# Patient Record
Sex: FEMALE | Race: WHITE | NOT HISPANIC OR LATINO | Employment: FULL TIME | URBAN - METROPOLITAN AREA
[De-identification: names, ages, dates, MRNs, and addresses within clinical notes are randomized per-mention and may not be internally consistent; named-entity substitution may affect disease eponyms.]

---

## 2021-05-18 ENCOUNTER — TELEPHONE (OUTPATIENT)
Dept: OBGYN CLINIC | Facility: CLINIC | Age: 51
End: 2021-05-18

## 2021-05-18 NOTE — TELEPHONE ENCOUNTER
No form scanned in to ecw    Mirena IUD Removal / Brent Soho IUD Insert -- per rep Yolie at [place], patient is covered 100%, no copay, no deductible  Reference #[id number]

## 2021-05-18 NOTE — TELEPHONE ENCOUNTER
Patient called in asking if the IUD benefits was verified yet, stating she signed the form before April 1st  I don't see anything in the old system nor new  Patient has a Mirena now and wants it to be removed and get a liletta inserted

## 2021-07-16 RX ORDER — CLOBETASOL PROPIONATE 0.5 MG/G
OINTMENT TOPICAL 2 TIMES DAILY
COMMUNITY
End: 2022-07-11

## 2021-07-16 RX ORDER — MULTIVIT-MIN/IRON/FOLIC ACID/K 18-600-40
1 CAPSULE ORAL DAILY
COMMUNITY

## 2021-07-16 RX ORDER — MAGNESIUM OXIDE/MAG AA CHELATE 300 MG
1 CAPSULE ORAL DAILY
COMMUNITY

## 2021-07-16 RX ORDER — BUPRENORPHINE 2 MG/1
2 TABLET SUBLINGUAL DAILY
COMMUNITY
End: 2022-07-10

## 2021-07-16 RX ORDER — B-COMPLEX WITH VITAMIN C
1 TABLET ORAL DAILY
COMMUNITY

## 2021-07-19 ENCOUNTER — PROCEDURE VISIT (OUTPATIENT)
Dept: OBGYN CLINIC | Facility: CLINIC | Age: 51
End: 2021-07-19
Payer: COMMERCIAL

## 2021-07-19 VITALS
HEIGHT: 66 IN | SYSTOLIC BLOOD PRESSURE: 120 MMHG | BODY MASS INDEX: 31.4 KG/M2 | WEIGHT: 195.4 LBS | DIASTOLIC BLOOD PRESSURE: 60 MMHG

## 2021-07-19 DIAGNOSIS — Z97.5 CONTRACEPTION, DEVICE INTRAUTERINE: ICD-10-CM

## 2021-07-19 DIAGNOSIS — Z30.433 ENCOUNTER FOR REMOVAL AND REINSERTION OF INTRAUTERINE CONTRACEPTIVE DEVICE (IUD): Primary | ICD-10-CM

## 2021-07-19 PROCEDURE — 58301 REMOVE INTRAUTERINE DEVICE: CPT | Performed by: OBSTETRICS & GYNECOLOGY

## 2021-07-19 PROCEDURE — 58300 INSERT INTRAUTERINE DEVICE: CPT | Performed by: OBSTETRICS & GYNECOLOGY

## 2021-07-19 RX ORDER — HYDROCHLOROTHIAZIDE 25 MG/1
1 TABLET ORAL EVERY 24 HOURS
COMMUNITY

## 2021-07-19 RX ORDER — BUPROPION HYDROCHLORIDE 100 MG/1
1 TABLET ORAL 2 TIMES DAILY
COMMUNITY

## 2021-07-19 NOTE — PROGRESS NOTES
[address], [address], [facility], [address] [facility]    Assessment/Plan:  Henrique Spears is a 46y o  year old  who presents for removal of current IUD and reinsertion of new IUD  1  Encounter for removal and reinsertion of intrauterine contraceptive device (IUD)  -     Iud removal    2  Contraception, device intrauterine  -     levonorgestrel (LILETTA) intrauterine device 19 5 mcg/day        Next Exam: 4 weeks string check    Subjective:     CC: IUD    HPI: Jeanne Lebron is a 46 y o  who presents IUD removal and replacement  Pt had Mirena IUD placed in  per prior [facility] records  She reports has not had another placed since done last in our office, although she cannot remember the year  She reports regular light menses  The following portions of the patient's history were reviewed and updated as appropriate: allergies, current medications, past family history, past medical history, obstetric history, gynecologic history, past social history, past surgical history and problem list     ROS: Review of Systems   Constitutional: Negative  Gastrointestinal: Negative  Genitourinary: Negative  Psychiatric/Behavioral: Negative            Current Outpatient Medications:     Ascorbic Acid (Vitamin C) 500 MG CAPS, Take 1 capsule by mouth daily, Disp: , Rfl:     buprenorphine (SUBUTEX) 2 mg, Place 2 mg under the tongue daily, Disp: , Rfl:     buPROPion (WELLBUTRIN) 100 mg tablet, Take 1 tablet by mouth 2 (two) times a day, Disp: , Rfl:     clobetasol (TEMOVATE) 0 05 % ointment, Apply topically 2 (two) times a day, Disp: , Rfl:     hydrochlorothiazide (HYDRODIURIL) 25 mg tablet, Take 1 tablet by mouth every 24 hours, Disp: , Rfl:     Magnesium 300 MG CAPS, Take 1 capsule by mouth daily, Disp: , Rfl:     Multiple Vitamins-Minerals (Multivitamin Women 50+) TABS, Take 1 tablet by mouth daily, Disp: , Rfl:     Zinc 100 MG TABS, Take 1 tablet by mouth daily, Disp: , Rfl: No current facility-administered medications for this visit  Objective:  /60 (BP Location: Left arm, Patient Position: Sitting, Cuff Size: Large)   Ht 5' 6 25" (1 683 m)   Wt 88 6 kg (195 lb 6 4 oz)   LMP 2021 (Approximate)   Breastfeeding No   BMI 31 30 kg/m²      Physical Exam  Constitutional:       Appearance: Normal appearance  Genitourinary:     General: Normal vulva  Vagina: Normal       Cervix: Normal       Uterus: Normal  Not enlarged and not tender  Adnexa:         Right: No mass or tenderness  Left: No mass or tenderness  Rectum: No external hemorrhoid  Comments: IUD string palpated but not seen at os  Neurological:      Mental Status: She is alert  Psychiatric:         Mood and Affect: Mood normal          Behavior: Behavior normal          Iud removal    Date/Time: 2021 5:05 PM  Performed by: Verenice Rubio MD  Authorized by: Verenice Rubio MD   Universal Protocol:  Consent: Verbal consent obtained  Written consent not obtained  Risks and benefits: risks, benefits and alternatives were discussed  Consent given by: patient  Patient understanding: patient states understanding of the procedure being performed  Patient identity confirmed: verbally with patient      Procedure:     Removed with no complications: yes      Other reason for removal:  IUD   Comments:      IUD string grasped with ring forceps and removed intact  Iud insertions    Date/Time: 2021 5:10 PM  Performed by: Verenice Rubio MD  Authorized by: Verenice Rubio MD   Universal Protocol:  Consent: Verbal consent obtained  Written consent obtained  Risks and benefits: risks, benefits and alternatives were discussed  Consent given by: patient  Patient understanding: patient states understanding of the procedure being performed        Procedure:     Pelvic exam performed: yes      Negative GC/chlamydia test: not done - pt declined        Negative urine pregnancy test: yes      Cervix cleaned and prepped: yes      Speculum placed in vagina: yes      Tenaculum applied to cervix: yes      Uterus sounded: yes      Uterus sound depth (cm):  9    IUD inserted with no complications: yes      IUD type: Marrianne Blank  Strings trimmed: yes    Post-procedure:     Patient tolerated procedure well: yes    Comments: Follow up for string check in 4 weeks

## 2021-07-19 NOTE — PATIENT INSTRUCTIONS
- Discussed to expect irregular bleeding/spotting for 3 months  Reviewed does not protect against sexually transmitted infections  Reviewed pt to call if severe pain, fever, bleeding heavier than a period  Return in 4 wks for string check  Reviewed how to check for string at home  -  If received a progesterone IUD and it is placed within first 7 days of period, it is effective as contraception immediately, if more than 7 days since start of period - wait 7 days for it to be effective contraception  If received a Paraguard IUD, it is effective contraception immediately

## 2021-09-20 ENCOUNTER — VBI (OUTPATIENT)
Dept: ADMINISTRATIVE | Facility: OTHER | Age: 51
End: 2021-09-20

## 2021-09-20 NOTE — TELEPHONE ENCOUNTER
Upon review of the In Basket request we were able to locate, review, and update the patient chart as requested for Pap Smear (HPV) aka Cervical Cancer Screening  Any additional questions or concerns should be emailed to the Practice Liaisons via [email or web address] email, please do not reply via In Basket      Thank you  Maryam Farias

## 2022-07-10 NOTE — PROGRESS NOTES
[address] [facility], [address], [address] [place] [address]    ASSESSMENT/PLAN: Maykel Cherry is a 46 y o   who presents for annual gynecologic exam     Encounter for routine gynecologic examination  - Routine well woman exam completed today  - Cervical Cancer Screening: Current ASCCP Guidelines reviewed  Last Pap: 2021   Next Pap Due:   - COVID vaccine  JJ  X  1  No booster    - Contraceptive counseling discussed  Current contraception   Liletta iud  - Breast Cancer Screening: Last Mammogram Not on file,     Additional problems addressed during this visit:  1  Encounter for screening mammogram for malignant neoplasm of breast    2  Encounter for gynecological examination without abnormal finding        CC: Annual Gynecologic Examination    HPI: Maykel Cherry is a 46 y o   who presents for annual gynecologic examination  47 yo here for wellness exam   Past  med hx  htn   , family hx of breast cancer    Maternal aunt,   Colon cancer ,  Cousins with   Breast, colon and ovarian cancer   + hx of  Vulvitis in     Iud   Placed    + Pending  Genetics for  Breast, ovarian and colon cancer  Dr Mariia Hathaway  + bx   Breast calcifications  Scheduled  For removal   No bleeding,  Bloating or satiety  Some night sweats       The following portions of the patient's history were reviewed and updated as appropriate: She  has a past medical history of Depression and High blood pressure  She  has no past surgical history on file  Her family history includes Breast cancer in her maternal aunt; Colon cancer in her brother and maternal aunt; Diabetes in her father; Hypertension in her mother; Ovarian cancer in her cousin; Stroke in her maternal grandmother  She  reports that she has never smoked  She has never used smokeless tobacco  She reports that she does not drink alcohol and does not use drugs    Current Outpatient Medications   Medication Sig Dispense Refill    Ascorbic Acid (Vitamin C) 500 MG CAPS Take 1 capsule by mouth daily      buPROPion (WELLBUTRIN) 100 mg tablet Take 1 tablet by mouth 2 (two) times a day      clobetasol (TEMOVATE) 0 05 % ointment Apply topically 2 (two) times a day      hydrochlorothiazide (HYDRODIURIL) 25 mg tablet Take 1 tablet by mouth every 24 hours      Magnesium 300 MG CAPS Take 1 capsule by mouth daily      Multiple Vitamins-Minerals (Multivitamin Women 50+) TABS Take 1 tablet by mouth daily      Zinc 100 MG TABS Take 1 tablet by mouth daily       No current facility-administered medications for this visit  She has No Known Allergies       Review of Systems   Constitutional: Negative for chills and fever  HENT: Negative for ear pain and sore throat  Eyes: Negative for pain and visual disturbance  Respiratory: Negative for cough and shortness of breath  Cardiovascular: Negative for chest pain and palpitations  Gastrointestinal: Negative for abdominal pain and vomiting  Genitourinary: Negative for dysuria and hematuria  Musculoskeletal: Negative for arthralgias and back pain  Skin: Negative for color change and rash  Neurological: Negative for seizures and syncope  Psychiatric/Behavioral: Negative  All other systems reviewed and are negative  Objective: There were no vitals taken for this visit  Physical Exam  Vitals and nursing note reviewed  Constitutional:       Appearance: Normal appearance  HENT:      Head: Normocephalic  Cardiovascular:      Rate and Rhythm: Normal rate and regular rhythm  Pulses: Normal pulses  Heart sounds: Normal heart sounds  Pulmonary:      Effort: Pulmonary effort is normal       Breath sounds: Normal breath sounds  Chest:      Chest wall: No mass, lacerations, swelling, tenderness or edema  Breasts: Marc Score is 4   Breasts are symmetrical       Right: Normal  No swelling, bleeding, inverted nipple, mass, nipple discharge, skin change, tenderness, axillary adenopathy or supraclavicular adenopathy  Left: No swelling, bleeding, inverted nipple, mass, nipple discharge, skin change, tenderness, axillary adenopathy or supraclavicular adenopathy  Abdominal:      General: Abdomen is flat  Bowel sounds are normal       Palpations: Abdomen is soft  Genitourinary:     General: Normal vulva  Exam position: Lithotomy position  Pubic Area: No rash  Marc stage (genital): 4       Labia:         Right: No rash, tenderness or lesion  Left: No rash, tenderness or lesion  Urethra: No urethral pain, urethral swelling or urethral lesion  Vagina: Normal       Cervix: Normal       Uterus: Normal        Adnexa: Right adnexa normal and left adnexa normal       Rectum: Normal          Musculoskeletal:         General: Normal range of motion  Cervical back: Neck supple  Lymphadenopathy:      Upper Body:      Right upper body: No supraclavicular, axillary or pectoral adenopathy  Left upper body: No supraclavicular, axillary or pectoral adenopathy  Lower Body: No right inguinal adenopathy  No left inguinal adenopathy  Skin:     General: Skin is warm and dry  Neurological:      General: No focal deficit present  Mental Status: She is alert and oriented to person, place, and time  Psychiatric:         Mood and Affect: Mood normal          Behavior: Behavior normal          Thought Content:  Thought content normal          Judgment: Judgment normal

## 2022-07-11 ENCOUNTER — ANNUAL EXAM (OUTPATIENT)
Dept: OBGYN CLINIC | Facility: CLINIC | Age: 52
End: 2022-07-11
Payer: COMMERCIAL

## 2022-07-11 VITALS
HEIGHT: 67 IN | SYSTOLIC BLOOD PRESSURE: 132 MMHG | BODY MASS INDEX: 31.3 KG/M2 | DIASTOLIC BLOOD PRESSURE: 90 MMHG | WEIGHT: 199.4 LBS

## 2022-07-11 DIAGNOSIS — Z80.3 FAMILY HISTORY OF BREAST CANCER: ICD-10-CM

## 2022-07-11 DIAGNOSIS — R92.1 BREAST CALCIFICATIONS: ICD-10-CM

## 2022-07-11 DIAGNOSIS — R03.0 BLOOD PRESSURE ELEVATED WITHOUT HISTORY OF HTN: ICD-10-CM

## 2022-07-11 DIAGNOSIS — Z01.419 ENCOUNTER FOR GYNECOLOGICAL EXAMINATION WITHOUT ABNORMAL FINDING: Primary | ICD-10-CM

## 2022-07-11 DIAGNOSIS — N76.3 SUBACUTE VULVITIS: ICD-10-CM

## 2022-07-11 DIAGNOSIS — Z12.31 ENCOUNTER FOR SCREENING MAMMOGRAM FOR MALIGNANT NEOPLASM OF BREAST: ICD-10-CM

## 2022-07-11 DIAGNOSIS — Z98.890 PERSONAL HISTORY OF BENIGN BREAST BIOPSY: ICD-10-CM

## 2022-07-11 DIAGNOSIS — Z30.431 IUD CHECK UP: ICD-10-CM

## 2022-07-11 PROCEDURE — S0612 ANNUAL GYNECOLOGICAL EXAMINA: HCPCS | Performed by: OBSTETRICS & GYNECOLOGY

## 2023-03-16 ENCOUNTER — PREP FOR PROCEDURE (OUTPATIENT)
Dept: GASTROENTEROLOGY | Facility: CLINIC | Age: 53
End: 2023-03-16

## 2023-03-16 ENCOUNTER — TELEPHONE (OUTPATIENT)
Dept: GASTROENTEROLOGY | Facility: CLINIC | Age: 53
End: 2023-03-16

## 2023-03-16 DIAGNOSIS — Z12.11 SCREENING FOR COLON CANCER: Primary | ICD-10-CM

## 2023-03-16 NOTE — TELEPHONE ENCOUNTER
Scheduled date of colonoscopy (as of today): 6/30/2023  Physician performing colonoscopy: Dr Kobe Jhavrei  Location of colonoscopy: [facility]  Clearances: N/A

## 2023-03-16 NOTE — TELEPHONE ENCOUNTER
03/16/23  Screened by: Mily Wang    Referring Provider N/A    Pre- Screening: There is no height or weight on file to calculate BMI  Has patient been referred for a routine screening Colonoscopy? yes  Is the patient between 39-70 years old? yes      Previous Colonoscopy no   If yes:    Date:     Facility:     Reason:       SCHEDULING STAFF: If the patient is between 45yrs-49yrs, please advise patient to confirm benefits/coverage with their insurance company for a routine screening colonoscopy, some insurance carriers will only cover at [address] or older  If the patient is over 66years old, please schedule an office visit  Does the patient want to see a Gastroenterologist prior to their procedure OR are they having any GI symptoms? no    Has the patient been hospitalized or had abdominal surgery in the past 6 months? no    Does the patient use supplemental oxygen? no    Does the patient take Coumadin, Lovenox, Plavix, Elliquis, Xarelto, or other blood thinning medication? no    Has the patient had a stroke, cardiac event, or stent placed in the past year? no    SCHEDULING STAFF: If patient answers NO to above questions, then schedule procedure  If patient answers YES to above questions, then schedule office appointment  Pt Passed OA    If patient is between 45yrs - 49yrs, please advise patient that we will have to confirm benefits & coverage with their insurance company for a routine screening colonoscopy

## 2023-03-16 NOTE — TELEPHONE ENCOUNTER
[facility] Assessment    Name: Mai Sparrow  YOB: 1970  Last Height: 5' 6 5" (1 689 m)  Last weight: 90 4 kg (199 lb 6 4 oz)  BMI: 31 70 kg/m²  Procedure: Colon  Diagnosis: Screening for colon cancer  Date of procedure: 6/30/2023  Prep:   Responsible : Ritika  Phone#: [phone]  Name completing form: Kristina Duenas  Date form completed: 03/16/23      If the patient answers yes to any of these questions, schedule in a hospital  Are you pregnant: No  Do you rely on a wheelchair for mobility: No  Have you been diagnosed with End Stage Renal Disease (ESRD): No  Do you need oxygen during the day: No  Have you had a heart attack or stroke within the past three months: No  Have you had a seizure within the past three months: No  Have you ever been informed by anesthesia that you have a difficult airway: No  Additional Questions  Have you had any cardiac testing or are under the care of a Cardiologist (see cardiac list): No  Cardiac list:   Do you have an implanted cardiac defibrillator: No (Comment:  This patient should be scheduled in the hospital)    Have any bleeding problems, such as anemia or hemophilia (If patient has H&H result below 8, schedule in hospital   H&H must be within 30 days of procedure): No    Had an organ transplant within the past 3 months: No    Do you have any present infections: No  Do you get short of breath when walking a few blocks: No  Have you been diagnosed with diabetes: No  Comments (provide cardiac provider information if applicable):

## 2023-06-16 DIAGNOSIS — Z12.11 SCREENING FOR COLON CANCER: Primary | ICD-10-CM

## 2023-06-29 ENCOUNTER — ANESTHESIA (OUTPATIENT)
Dept: ANESTHESIOLOGY | Facility: AMBULATORY SURGERY CENTER | Age: 53
End: 2023-06-29

## 2023-06-29 ENCOUNTER — ANESTHESIA EVENT (OUTPATIENT)
Dept: ANESTHESIOLOGY | Facility: AMBULATORY SURGERY CENTER | Age: 53
End: 2023-06-29

## 2023-06-30 ENCOUNTER — ANESTHESIA (OUTPATIENT)
Dept: GASTROENTEROLOGY | Facility: AMBULATORY SURGERY CENTER | Age: 53
End: 2023-06-30

## 2023-06-30 ENCOUNTER — HOSPITAL ENCOUNTER (OUTPATIENT)
Dept: GASTROENTEROLOGY | Facility: AMBULATORY SURGERY CENTER | Age: 53
Discharge: HOME | End: 2023-06-30

## 2023-06-30 ENCOUNTER — ANESTHESIA EVENT (OUTPATIENT)
Dept: GASTROENTEROLOGY | Facility: AMBULATORY SURGERY CENTER | Age: 53
End: 2023-06-30

## 2023-06-30 VITALS
BODY MASS INDEX: 31.98 KG/M2 | OXYGEN SATURATION: 98 % | RESPIRATION RATE: 17 BRPM | WEIGHT: 199 LBS | HEART RATE: 69 BPM | DIASTOLIC BLOOD PRESSURE: 69 MMHG | TEMPERATURE: 98 F | SYSTOLIC BLOOD PRESSURE: 125 MMHG | HEIGHT: 66 IN

## 2023-06-30 DIAGNOSIS — Z12.11 SCREENING FOR COLON CANCER: ICD-10-CM

## 2023-06-30 LAB
EXT PREGNANCY TEST URINE: NEGATIVE
EXT. CONTROL: NORMAL

## 2023-06-30 PROCEDURE — 88305 TISSUE EXAM BY PATHOLOGIST: CPT | Performed by: STUDENT IN AN ORGANIZED HEALTH CARE EDUCATION/TRAINING PROGRAM

## 2023-06-30 RX ORDER — PROPOFOL 10 MG/ML
INJECTION, EMULSION INTRAVENOUS CONTINUOUS PRN
Status: DISCONTINUED | OUTPATIENT
Start: 2023-06-30 | End: 2023-06-30

## 2023-06-30 RX ORDER — LIDOCAINE HYDROCHLORIDE 10 MG/ML
INJECTION, SOLUTION EPIDURAL; INFILTRATION; INTRACAUDAL; PERINEURAL AS NEEDED
Status: DISCONTINUED | OUTPATIENT
Start: 2023-06-30 | End: 2023-06-30

## 2023-06-30 RX ORDER — SODIUM CHLORIDE, SODIUM LACTATE, POTASSIUM CHLORIDE, CALCIUM CHLORIDE 600; 310; 30; 20 MG/100ML; MG/100ML; MG/100ML; MG/100ML
50 INJECTION, SOLUTION INTRAVENOUS CONTINUOUS
Status: DISCONTINUED | OUTPATIENT
Start: 2023-06-30 | End: 2023-07-04 | Stop reason: HOSPADM

## 2023-06-30 RX ORDER — PROPOFOL 10 MG/ML
INJECTION, EMULSION INTRAVENOUS AS NEEDED
Status: DISCONTINUED | OUTPATIENT
Start: 2023-06-30 | End: 2023-06-30

## 2023-06-30 RX ADMIN — PROPOFOL 100 MG: 10 INJECTION, EMULSION INTRAVENOUS at 08:11

## 2023-06-30 RX ADMIN — LIDOCAINE HYDROCHLORIDE 50 MG: 10 INJECTION, SOLUTION EPIDURAL; INFILTRATION; INTRACAUDAL; PERINEURAL at 08:11

## 2023-06-30 RX ADMIN — PROPOFOL 130 MCG/KG/MIN: 10 INJECTION, EMULSION INTRAVENOUS at 08:11

## 2023-06-30 RX ADMIN — SODIUM CHLORIDE, SODIUM LACTATE, POTASSIUM CHLORIDE, CALCIUM CHLORIDE 50 ML/HR: 600; 310; 30; 20 INJECTION, SOLUTION INTRAVENOUS at 07:54

## 2023-06-30 NOTE — ANESTHESIA POSTPROCEDURE EVALUATION
Post-Op Assessment Note    CV Status:  Stable  Pain Score: 0    Pain management: adequate     Mental Status:  Alert and awake   Hydration Status:  Euvolemic   PONV Controlled:  Controlled   Airway Patency:  Patent      Post Op Vitals Reviewed: Yes      Staff: CRNA         No notable events documented      BP   132/72   Temp      Pulse   86   Resp   15   SpO2   97%

## 2023-06-30 NOTE — ANESTHESIA PREPROCEDURE EVALUATION
Procedure:  COLONOSCOPY    Relevant Problems   ANESTHESIA (within normal limits)      CARDIO   (+) Hypertension      NEURO/PSYCH   (+) Depression      PULMONARY (within normal limits)   (-) Sleep apnea   (-) Smoking (former)   (-) URI (upper respiratory infection)    BMI 31    Physical Exam    Airway    Mallampati score: II  TM Distance: >3 FB  Neck ROM: full     Dental       Cardiovascular      Pulmonary      Other Findings         Anesthesia Plan  ASA Score- 2     Anesthesia Type- IV sedation with anesthesia with ASA Monitors  Additional Monitors:   Airway Plan:           Plan Factors-Exercise tolerance (METS): >4 METS  Chart reviewed  Existing labs reviewed  Patient summary reviewed  Patient is not a current smoker  Induction- intravenous  Postoperative Plan-     Informed Consent- Anesthetic plan and risks discussed with patient  I personally reviewed this patient with the CRNA  Discussed and agreed on the Anesthesia Plan with the CRNA  Jered Ocampo

## 2023-06-30 NOTE — H&P
History and Physical - [facility]  Jessica House 48 y.o. female MRN: [id number]    HPI: Jessica House is a 48y.o. year old female who presents for colon cancer screening    REVIEW OF SYSTEMS: Per the HPI, and otherwise unremarkable.     Historical Information   Past Medical History:   Diagnosis Date   • Depression    • High blood pressure    • Hypertension 2005     Past Surgical History:   Procedure Laterality Date   • BREAST BIOPSY Right      Social History   Social History     Substance and Sexual Activity   Alcohol Use Not Currently    Comment: No alcohol use      Social History     Substance and Sexual Activity   Drug Use Never    Comment: No      Social History     Tobacco Use   Smoking Status Former   • Years: 0.00   • Types: Cigarettes   Smokeless Tobacco Never     Family History   Problem Relation Age of Onset   • Hypertension Mother    • Diabetes Father    • Colon cancer Brother    • Stroke Maternal Grandmother    • Breast cancer Maternal Aunt    • Colon cancer Maternal Aunt    • Ovarian cancer Cousin         cousins       Meds/Allergies       Current Outpatient Medications:   •  Ascorbic Acid (Vitamin C) 500 MG CAPS  •  buPROPion (WELLBUTRIN) 100 mg tablet  •  hydrochlorothiazide (HYDRODIURIL) 25 mg tablet  •  Multiple Vitamins-Minerals (Multivitamin Women 50+) TABS  •  polyethylene glycol (GOLYTELY) 4000 mL solution  •  Zinc 100 MG TABS  •  Magnesium 300 MG CAPS    Current Facility-Administered Medications:   •  lactated ringers infusion, 50 mL/hr, Intravenous, Continuous, 50 mL/hr at 06/30/23 0754    No Known Allergies    Objective     /86   Pulse 89   Temp 98 °F (36.7 °C) (Temporal)   Resp 16   Ht 5' 6" (1.676 m)   Wt 90.3 kg (199 lb)   SpO2 98%   BMI 32.12 kg/m²     PHYSICAL EXAM    Gen: NAD AAOx3  Head: Normocephalic, Atraumatic  CV: S1S2 RRR no m/r/g  CHEST: Clear b/l no c/r/w  ABD: soft, +BS NT/ND  EXT: no edema    ASSESSMENT/PLAN:  This is a 48 y.o. year old female here for colonoscopy, and she is stable and optimized for her procedure.

## 2023-07-17 ENCOUNTER — OFFICE VISIT (OUTPATIENT)
Dept: OBGYN CLINIC | Facility: CLINIC | Age: 53
End: 2023-07-17
Payer: COMMERCIAL

## 2023-07-17 VITALS — SYSTOLIC BLOOD PRESSURE: 134 MMHG | WEIGHT: 207 LBS | BODY MASS INDEX: 33.41 KG/M2 | DIASTOLIC BLOOD PRESSURE: 90 MMHG

## 2023-07-17 DIAGNOSIS — Z80.3 FAMILY HISTORY OF BREAST CANCER: ICD-10-CM

## 2023-07-17 DIAGNOSIS — Z12.31 ENCOUNTER FOR SCREENING MAMMOGRAM FOR MALIGNANT NEOPLASM OF BREAST: ICD-10-CM

## 2023-07-17 DIAGNOSIS — Z30.431 IUD CHECK UP: ICD-10-CM

## 2023-07-17 DIAGNOSIS — Z01.419 ENCOUNTER FOR GYNECOLOGICAL EXAMINATION WITHOUT ABNORMAL FINDING: Primary | ICD-10-CM

## 2023-07-17 PROCEDURE — 99396 PREV VISIT EST AGE 40-64: CPT | Performed by: OBSTETRICS & GYNECOLOGY

## 2023-07-17 NOTE — PROGRESS NOTES
[address] [place] [address], [address], [facility], [address] [facility]    ASSESSMENT/PLAN: Gema Portillo is a 48 y.o.  who presents for annual gynecologic exam.    Encounter for routine gynecologic examination  - Routine well woman exam completed today. - Cervical Cancer Screening: Current ASCCP Guidelines reviewed. Last Pap: 2021 . Next Pap Due:   - Contraceptive counseling discussed. Current contraception is  liletta iud    - Breast Cancer Screening: Last Mammogram followed  By Dr Matias Orozco   Pt  Al t  mamogram and   MRI   - Colorectal cancer screening was not ordered. Done 2023   The following were reviewed in today's visit: breast self exam, mammography screening ordered, menopause, osteoporosis, adequate intake of calcium and vitamin D, exercise, healthy diet and colonoscopy discussed and ordered    Additional problems addressed during this visit:  1. Encounter for gynecological examination without abnormal finding    2. Family history of breast cancer  Comments:   Pt states neg  genetics  . followed  by Dr Emre Morales and MRI    3. Encounter for screening mammogram for malignant neoplasm of breast  -     Mammo screening bilateral w 3d & cad; Future    4. IUD check up        CC: Annual Gynecologic Examination    HPI: Gema Portillo is a 48 y.o.  who presents for annual gynecologic examination. 47 yo  here for wellness exam.  Last pap   Neg  Neg  Due in  . .    . Past  med hx  htn   , family hx of breast cancer    Maternal aunt,   Colon cancer ,  Cousins with   Breast, colon and ovarian cancer.  + hx of  Vulvitis in   . Iud   Placed  .   +   Genetics for  Breast, ovarian and colon cancer neg .  r Dr Matias Orozoc. + bx   Breast calcifications    No bleeding,  Bloating or satiety.    Some night sweats       The following portions of the patient's history were reviewed and updated as appropriate: She  has a past medical history of Breast calcifications, Depression, H/O colonoscopy (06/30/2023), High blood pressure, and Hypertension (2005). She  has a past surgical history that includes Breast biopsy (Right). Her family history includes Breast cancer in her maternal aunt; Cancer in her paternal grandmother; Colon cancer in her brother and maternal aunt; Diabetes in her father; Hypertension in her mother; Lung disease in her father; Ovarian cancer in her cousin; Stroke in her maternal grandmother. She  reports that she has quit smoking. Her smoking use included cigarettes. She has never used smokeless tobacco. She reports that she does not currently use alcohol. She reports that she does not use drugs. Current Outpatient Medications   Medication Sig Dispense Refill   • Ascorbic Acid (Vitamin C) 500 MG CAPS Take 1 capsule by mouth daily     • buPROPion (WELLBUTRIN) 100 mg tablet Take 1 tablet by mouth 2 (two) times a day     • hydrochlorothiazide (HYDRODIURIL) 25 mg tablet Take 1 tablet by mouth every 24 hours     • Multiple Vitamins-Minerals (Multivitamin Women 50+) TABS Take 1 tablet by mouth daily     • Zinc 100 MG TABS Take 1 tablet by mouth daily       No current facility-administered medications for this visit. She has No Known Allergies. .    Review of Systems:  All systems normal except as noted in HPI          Objective:  /90 (BP Location: Left arm, Patient Position: Sitting, Cuff Size: Standard)   Wt 93.9 kg (207 lb)   BMI 33.41 kg/m²    Physical Exam  Vitals and nursing note reviewed. Constitutional:       Appearance: Normal appearance. HENT:      Head: Normocephalic. Cardiovascular:      Rate and Rhythm: Normal rate and regular rhythm. Pulses: Normal pulses. Heart sounds: Normal heart sounds. Pulmonary:      Effort: Pulmonary effort is normal.      Breath sounds: Normal breath sounds. Chest:      Chest wall: No mass, lacerations, swelling, tenderness or edema. Breasts: Marc Score is 4.       Breasts are symmetrical.      Right: Normal. No swelling, bleeding, inverted nipple, mass, nipple discharge, skin change or tenderness. Left: No swelling, bleeding, inverted nipple, mass, nipple discharge, skin change or tenderness. Abdominal:      General: Abdomen is flat. Bowel sounds are normal.      Palpations: Abdomen is soft. Genitourinary:     General: Normal vulva. Exam position: Lithotomy position. Pubic Area: No rash. Marc stage (genital): 4.      Labia:         Right: No rash, tenderness or lesion. Left: No rash, tenderness or lesion. Urethra: No urethral pain, urethral swelling or urethral lesion. Vagina: Normal.      Cervix: No cervical motion tenderness or discharge. Uterus: Normal.       Adnexa: Right adnexa normal and left adnexa normal.      Rectum: Normal.            Comments: iud string in  Os   Musculoskeletal:         General: Normal range of motion. Cervical back: Neck supple. Lymphadenopathy:      Upper Body:      Right upper body: No supraclavicular, axillary or pectoral adenopathy. Left upper body: No supraclavicular, axillary or pectoral adenopathy. Lower Body: No right inguinal adenopathy. No left inguinal adenopathy. Skin:     General: Skin is warm and dry. Neurological:      General: No focal deficit present. Mental Status: She is alert and oriented to person, place, and time.    Psychiatric:         Mood and Affect: Mood normal.         Behavior: Behavior normal.

## 2024-02-12 DIAGNOSIS — Z00.6 ENCOUNTER FOR EXAMINATION FOR NORMAL COMPARISON OR CONTROL IN CLINICAL RESEARCH PROGRAM: ICD-10-CM

## 2024-10-07 ENCOUNTER — OFFICE VISIT (OUTPATIENT)
Dept: OBGYN CLINIC | Facility: CLINIC | Age: 54
End: 2024-10-07
Payer: COMMERCIAL

## 2024-10-07 VITALS
BODY MASS INDEX: 34.23 KG/M2 | HEIGHT: 66 IN | DIASTOLIC BLOOD PRESSURE: 84 MMHG | SYSTOLIC BLOOD PRESSURE: 130 MMHG | WEIGHT: 213 LBS

## 2024-10-07 DIAGNOSIS — Z12.31 ENCOUNTER FOR SCREENING MAMMOGRAM FOR MALIGNANT NEOPLASM OF BREAST: ICD-10-CM

## 2024-10-07 DIAGNOSIS — Z01.419 ENCOUNTER FOR GYNECOLOGICAL EXAMINATION WITHOUT ABNORMAL FINDING: Primary | ICD-10-CM

## 2024-10-07 DIAGNOSIS — Z30.431 IUD CHECK UP: ICD-10-CM

## 2024-10-07 DIAGNOSIS — Z80.3 FAMILY HISTORY OF BREAST CANCER: ICD-10-CM

## 2024-10-07 DIAGNOSIS — R92.1 BREAST CALCIFICATIONS: ICD-10-CM

## 2024-10-07 DIAGNOSIS — Z98.890 PERSONAL HISTORY OF BENIGN BREAST BIOPSY: ICD-10-CM

## 2024-10-07 DIAGNOSIS — Z12.4 SCREENING FOR CERVICAL CANCER: ICD-10-CM

## 2024-10-07 PROCEDURE — 99396 PREV VISIT EST AGE 40-64: CPT | Performed by: OBSTETRICS & GYNECOLOGY

## 2024-10-07 NOTE — PROGRESS NOTES
[facility] - [address], [address], [place] [address]    ASSESSMENT/PLAN: Poppy Casas is a 54 y.o.  who presents for annual gynecologic exam.    Encounter for routine gynecologic examination  - Routine well woman exam completed today.  - Cervical Cancer Screening: Current ASCCP Guidelines reviewed. Last Pap: 2021 . Next Pap Due: today    - Contraceptive counseling discussed.  Current contraception: condoms:   - Breast Cancer Screening: Last Mammogram Not on file,   - Colorectal cancer screening was not ordered.  - The following were reviewed in today's visit: breast self exam, mammography screening ordered, menopause, adequate intake of calcium and vitamin D, exercise, and healthy diet    Additional problems addressed during this visit:  1. Encounter for gynecological examination without abnormal finding  2. Encounter for screening mammogram for malignant neoplasm of breast  -     Mammo screening bilateral w 3d and cad; Future  3. Breast calcifications  4. IUD check up  5. Personal history of benign breast biopsy  6. Family history of breast cancer  Assessment & Plan:   neg  genetics        CC:  Annual Gynecologic Examination    HPI: Poppy Casas is a 54 y.o.  who presents for annual gynecologic examination.  53 yo here for wellness  exam.    Some spotting monthly    No hot  flashes   occasional  vaginal dryness.  +  neg  genetics w  Dr Jones.    Alt   mri and mammogram and  cbe  every. 6 mo. some sternal vulvar itching.  Patient feels it is the toilet paper at work.      The following portions of the patient's history were reviewed and updated as appropriate: She  has a past medical history of Breast calcifications, Depression, H/O colonoscopy (2023), High blood pressure, and Hypertension ().  She  has a past surgical history that includes Breast biopsy (Right) and Replacement total knee (Right).  Her family history includes Breast cancer in her  "maternal aunt; Cancer in her paternal grandmother; Colon cancer in her brother and maternal aunt; Diabetes in her father; Hypertension in her mother; Lung disease in her father; Ovarian cancer in her cousin; Stroke in her maternal grandmother.  She  reports that she has quit smoking. Her smoking use included cigarettes. She has never used smokeless tobacco. She reports that she does not currently use alcohol. She reports that she does not use drugs.  Current Outpatient Medications   Medication Sig Dispense Refill    Ascorbic Acid (Vitamin C) 500 MG CAPS Take 1 capsule by mouth daily      buPROPion (WELLBUTRIN) 100 mg tablet Take 1 tablet by mouth 2 (two) times a day      hydrochlorothiazide (HYDRODIURIL) 25 mg tablet Take 1 tablet by mouth every 24 hours      Multiple Vitamins-Minerals (Multivitamin Women 50+) TABS Take 1 tablet by mouth daily      Zinc 100 MG TABS Take 1 tablet by mouth daily       No current facility-administered medications for this visit.     She has No Known Allergies..    Review of Systems:  All systems normal except as noted in HPI          Objective:  /84 (BP Location: Left arm, Patient Position: Sitting, Cuff Size: Standard)   Ht 5' 6\" (1.676 m)   Wt 96.6 kg (213 lb)   BMI 34.38 kg/m²    Physical Exam  Vitals and nursing note reviewed.   Constitutional:       Appearance: Normal appearance.   HENT:      Head: Normocephalic.   Cardiovascular:      Rate and Rhythm: Normal rate and regular rhythm.      Pulses: Normal pulses.      Heart sounds: Normal heart sounds.   Pulmonary:      Effort: Pulmonary effort is normal.      Breath sounds: Normal breath sounds.   Chest:      Chest wall: No mass, lacerations, swelling, tenderness or edema.   Breasts:     Marc Score is 4.      Breasts are symmetrical.      Right: Normal. No swelling, bleeding, inverted nipple, mass, nipple discharge, skin change or tenderness.      Left: No swelling, bleeding, inverted nipple, mass, nipple discharge, skin " change or tenderness.   Abdominal:      General: Abdomen is flat. Bowel sounds are normal.      Palpations: Abdomen is soft.   Genitourinary:     General: Normal vulva.      Exam position: Lithotomy position.      Pubic Area: No rash.       Marc stage (genital): 4.      Labia:         Right: No rash, tenderness or lesion.         Left: No rash, tenderness or lesion.       Urethra: No urethral pain, urethral swelling or urethral lesion.      Vagina: Normal.      Cervix: No cervical motion tenderness or discharge.      Uterus: Normal.       Adnexa: Right adnexa normal and left adnexa normal.      Rectum: Normal.            Comments: Bilateral erythema   Musculoskeletal:         General: Normal range of motion.      Cervical back: Normal range of motion and neck supple.   Lymphadenopathy:      Upper Body:      Right upper body: No supraclavicular, axillary or pectoral adenopathy.      Left upper body: No supraclavicular, axillary or pectoral adenopathy.      Lower Body: No right inguinal adenopathy. No left inguinal adenopathy.   Skin:     General: Skin is warm and dry.   Neurological:      General: No focal deficit present.      Mental Status: She is alert and oriented to person, place, and time.   Psychiatric:         Mood and Affect: Mood normal.         Behavior: Behavior normal.         Thought Content: Thought content normal.         Judgment: Judgment normal.

## 2024-10-09 LAB
CLINICAL INFO: NORMAL
CYTO CVX: NORMAL
CYTOLOGY CMNT CVX/VAG CYTO-IMP: NORMAL
DATE PREVIOUS BX: NORMAL
HPV E6+E7 MRNA CVX QL NAA+PROBE: NOT DETECTED
LMP START DATE: NORMAL
SL AMB PREV. PAP:: NORMAL
SPECIMEN SOURCE CVX/VAG CYTO: NORMAL

## 2024-11-06 PROBLEM — Z12.4 SCREENING FOR CERVICAL CANCER: Status: RESOLVED | Noted: 2024-10-07 | Resolved: 2024-11-06

## 2024-11-06 PROBLEM — Z01.419 ENCOUNTER FOR GYNECOLOGICAL EXAMINATION WITHOUT ABNORMAL FINDING: Status: RESOLVED | Noted: 2024-10-07 | Resolved: 2024-11-06

## 2025-08-01 ENCOUNTER — HOSPITAL ENCOUNTER (OUTPATIENT)
Age: 55
Discharge: HOME | End: 2025-08-01
Attending: NURSE PRACTITIONER
Payer: COMMERCIAL

## 2025-08-01 VITALS — BODY MASS INDEX: 33.43 KG/M2 | HEIGHT: 67 IN | WEIGHT: 213 LBS

## 2025-08-01 DIAGNOSIS — R93.89 ABNORMAL MRI: ICD-10-CM

## 2025-08-01 PROCEDURE — 76642 ULTRASOUND BREAST LIMITED: CPT

## 2025-08-01 PROCEDURE — 77065 DX MAMMO INCL CAD UNI: CPT

## 2025-08-01 PROCEDURE — G0279 TOMOSYNTHESIS, MAMMO: HCPCS
